# Patient Record
Sex: FEMALE | Race: WHITE | Employment: OTHER | URBAN - NONMETROPOLITAN AREA
[De-identification: names, ages, dates, MRNs, and addresses within clinical notes are randomized per-mention and may not be internally consistent; named-entity substitution may affect disease eponyms.]

---

## 2020-01-15 ENCOUNTER — NURSE ONLY (OUTPATIENT)
Dept: LAB | Age: 70
End: 2020-01-15

## 2020-01-15 ENCOUNTER — OFFICE VISIT (OUTPATIENT)
Dept: FAMILY MEDICINE CLINIC | Age: 70
End: 2020-01-15
Payer: MEDICARE

## 2020-01-15 VITALS
TEMPERATURE: 98.5 F | OXYGEN SATURATION: 98 % | DIASTOLIC BLOOD PRESSURE: 78 MMHG | HEART RATE: 98 BPM | HEIGHT: 64 IN | SYSTOLIC BLOOD PRESSURE: 122 MMHG | RESPIRATION RATE: 12 BRPM | BODY MASS INDEX: 40.43 KG/M2 | WEIGHT: 236.8 LBS

## 2020-01-15 PROBLEM — C51.9 MALIGNANT TUMOR OF VULVA (HCC): Status: ACTIVE | Noted: 2019-11-12

## 2020-01-15 PROBLEM — C21.0: Status: ACTIVE | Noted: 2019-09-09

## 2020-01-15 PROBLEM — R53.83 FATIGUE: Status: ACTIVE | Noted: 2020-01-15

## 2020-01-15 PROBLEM — M17.10 PRIMARY LOCALIZED OSTEOARTHROSIS, LOWER LEG: Status: ACTIVE | Noted: 2017-01-17

## 2020-01-15 PROBLEM — E66.01 OBESITY, MORBID, BMI 40.0-49.9 (HCC): Status: ACTIVE | Noted: 2019-11-14

## 2020-01-15 PROBLEM — I10 HYPERTENSION: Status: ACTIVE | Noted: 2017-12-22

## 2020-01-15 PROBLEM — H26.9 CATARACT: Status: ACTIVE | Noted: 2020-01-15

## 2020-01-15 PROBLEM — N60.19 FIBROCYSTIC BREAST: Status: ACTIVE | Noted: 2020-01-15

## 2020-01-15 PROBLEM — M19.90 ARTHRITIS: Status: ACTIVE | Noted: 2020-01-15

## 2020-01-15 PROBLEM — M54.9 BACKACHE: Status: ACTIVE | Noted: 2020-01-15

## 2020-01-15 PROBLEM — C51.9 PAGET'S DISEASE OF VULVA (HCC): Status: ACTIVE | Noted: 2019-11-12

## 2020-01-15 LAB
ALBUMIN SERPL-MCNC: 4.3 G/DL (ref 3.5–5.1)
ALP BLD-CCNC: 113 U/L (ref 38–126)
ALT SERPL-CCNC: 25 U/L (ref 11–66)
AMYLASE: 52 U/L (ref 20–104)
ANION GAP SERPL CALCULATED.3IONS-SCNC: 15 MEQ/L (ref 8–16)
AST SERPL-CCNC: 21 U/L (ref 5–40)
AVERAGE GLUCOSE: 111 MG/DL (ref 70–126)
BASOPHILS # BLD: 0.8 %
BASOPHILS ABSOLUTE: 0.1 THOU/MM3 (ref 0–0.1)
BILIRUB SERPL-MCNC: 0.4 MG/DL (ref 0.3–1.2)
BILIRUBIN DIRECT: < 0.2 MG/DL (ref 0–0.3)
BUN BLDV-MCNC: 22 MG/DL (ref 7–22)
CALCIUM SERPL-MCNC: 9.8 MG/DL (ref 8.5–10.5)
CEA: 6.1 NG/ML (ref 0–5)
CHLORIDE BLD-SCNC: 105 MEQ/L (ref 98–111)
CHOLESTEROL, TOTAL: 211 MG/DL (ref 100–199)
CO2: 23 MEQ/L (ref 23–33)
CREAT SERPL-MCNC: 0.6 MG/DL (ref 0.4–1.2)
EOSINOPHIL # BLD: 2.7 %
EOSINOPHILS ABSOLUTE: 0.2 THOU/MM3 (ref 0–0.4)
ERYTHROCYTE [DISTWIDTH] IN BLOOD BY AUTOMATED COUNT: 12.4 % (ref 11.5–14.5)
ERYTHROCYTE [DISTWIDTH] IN BLOOD BY AUTOMATED COUNT: 41.1 FL (ref 35–45)
ESTRADIOL LEVEL: < 5 PG/ML
FOLLICLE STIMULATING HORMONE: 35.7 MIU/ML (ref 16–160)
GFR SERPL CREATININE-BSD FRML MDRD: > 90 ML/MIN/1.73M2
GLUCOSE BLD-MCNC: 105 MG/DL (ref 70–108)
HBA1C MFR BLD: 5.7 % (ref 4.4–6.4)
HCT VFR BLD CALC: 44.5 % (ref 37–47)
HDLC SERPL-MCNC: 57 MG/DL
HEMOGLOBIN: 14.6 GM/DL (ref 12–16)
IMMATURE GRANS (ABS): 0.02 THOU/MM3 (ref 0–0.07)
IMMATURE GRANULOCYTES: 0.2 %
IRON: 107 UG/DL (ref 50–170)
LDL CHOLESTEROL CALCULATED: 130 MG/DL
LIPASE: 29.5 U/L (ref 5.6–51.3)
LUTEINIZING HORMONE: 20.5 MIU/ML (ref 3.3–70.6)
LYMPHOCYTES # BLD: 33.4 %
LYMPHOCYTES ABSOLUTE: 2.8 THOU/MM3 (ref 1–4.8)
MAGNESIUM: 1.9 MG/DL (ref 1.6–2.4)
MCH RBC QN AUTO: 30 PG (ref 26–33)
MCHC RBC AUTO-ENTMCNC: 32.8 GM/DL (ref 32.2–35.5)
MCV RBC AUTO: 91.4 FL (ref 81–99)
MONOCYTES # BLD: 6.1 %
MONOCYTES ABSOLUTE: 0.5 THOU/MM3 (ref 0.4–1.3)
NUCLEATED RED BLOOD CELLS: 0 /100 WBC
PLATELET # BLD: 271 THOU/MM3 (ref 130–400)
PMV BLD AUTO: 12.3 FL (ref 9.4–12.4)
POTASSIUM SERPL-SCNC: 3.7 MEQ/L (ref 3.5–5.2)
PROGESTERONE LEVEL: < 0.05 NG/ML
PTH INTACT: 19.8 PG/ML (ref 15–65)
RBC # BLD: 4.87 MILL/MM3 (ref 4.2–5.4)
RHEUMATOID FACTOR: 12 IU/ML (ref 0–13)
SEDIMENTATION RATE, ERYTHROCYTE: 18 MM/HR (ref 0–20)
SEG NEUTROPHILS: 56.8 %
SEGMENTED NEUTROPHILS ABSOLUTE COUNT: 4.8 THOU/MM3 (ref 1.8–7.7)
SODIUM BLD-SCNC: 143 MEQ/L (ref 135–145)
T3 TOTAL: 133 NG/DL (ref 72–181)
T4 FREE: 2.11 NG/DL (ref 0.93–1.76)
TOTAL IRON BINDING CAPACITY: 347 UG/DL (ref 171–450)
TOTAL PROTEIN: 7.7 G/DL (ref 6.1–8)
TRIGL SERPL-MCNC: 118 MG/DL (ref 0–199)
TSH SERPL DL<=0.05 MIU/L-ACNC: 0.05 UIU/ML (ref 0.4–4.2)
URIC ACID: 5.3 MG/DL (ref 2.4–5.7)
VITAMIN D 25-HYDROXY: 69 NG/ML (ref 30–100)
WBC # BLD: 8.4 THOU/MM3 (ref 4.8–10.8)

## 2020-01-15 PROCEDURE — 3017F COLORECTAL CA SCREEN DOC REV: CPT | Performed by: FAMILY MEDICINE

## 2020-01-15 PROCEDURE — 4040F PNEUMOC VAC/ADMIN/RCVD: CPT | Performed by: FAMILY MEDICINE

## 2020-01-15 PROCEDURE — 99204 OFFICE O/P NEW MOD 45 MIN: CPT | Performed by: FAMILY MEDICINE

## 2020-01-15 PROCEDURE — G8400 PT W/DXA NO RESULTS DOC: HCPCS | Performed by: FAMILY MEDICINE

## 2020-01-15 PROCEDURE — 1123F ACP DISCUSS/DSCN MKR DOCD: CPT | Performed by: FAMILY MEDICINE

## 2020-01-15 PROCEDURE — G8484 FLU IMMUNIZE NO ADMIN: HCPCS | Performed by: FAMILY MEDICINE

## 2020-01-15 PROCEDURE — 1090F PRES/ABSN URINE INCON ASSESS: CPT | Performed by: FAMILY MEDICINE

## 2020-01-15 PROCEDURE — G8427 DOCREV CUR MEDS BY ELIG CLIN: HCPCS | Performed by: FAMILY MEDICINE

## 2020-01-15 PROCEDURE — G8417 CALC BMI ABV UP PARAM F/U: HCPCS | Performed by: FAMILY MEDICINE

## 2020-01-15 PROCEDURE — 4004F PT TOBACCO SCREEN RCVD TLK: CPT | Performed by: FAMILY MEDICINE

## 2020-01-15 RX ORDER — MELATONIN
5000 DAILY PRN
COMMUNITY

## 2020-01-15 RX ORDER — LEVOTHYROXINE SODIUM 175 UG/1
1 TABLET ORAL DAILY
COMMUNITY

## 2020-01-15 RX ORDER — TRIAMTERENE AND HYDROCHLOROTHIAZIDE 37.5; 25 MG/1; MG/1
1 CAPSULE ORAL DAILY
COMMUNITY
Start: 2019-07-08

## 2020-01-15 RX ORDER — OMEPRAZOLE 20 MG/1
CAPSULE, DELAYED RELEASE ORAL PRN
COMMUNITY
Start: 2019-07-08

## 2020-01-15 SDOH — HEALTH STABILITY: MENTAL HEALTH: HOW OFTEN DO YOU HAVE A DRINK CONTAINING ALCOHOL?: NEVER

## 2020-01-15 ASSESSMENT — ENCOUNTER SYMPTOMS
SINUS PAIN: 1
SHORTNESS OF BREATH: 1
CONSTIPATION: 1
BACK PAIN: 1
ABDOMINAL PAIN: 1

## 2020-01-15 ASSESSMENT — PATIENT HEALTH QUESTIONNAIRE - PHQ9
SUM OF ALL RESPONSES TO PHQ9 QUESTIONS 1 & 2: 0
1. LITTLE INTEREST OR PLEASURE IN DOING THINGS: 0
2. FEELING DOWN, DEPRESSED OR HOPELESS: 0
SUM OF ALL RESPONSES TO PHQ QUESTIONS 1-9: 0
SUM OF ALL RESPONSES TO PHQ QUESTIONS 1-9: 0

## 2020-01-15 NOTE — PROGRESS NOTES
never smoked. She has never used smokeless tobacco. She reports previous alcohol use. She reports that she does not use drugs. Medications/Allergies/Immunizations:     Her current medication(s) include   Current Outpatient Medications:     MULTIPLE VITAMINS PO, Take 1 tablet by mouth daily as needed, Disp: , Rfl:     omeprazole (PRILOSEC) 20 MG delayed release capsule, as needed, Disp: , Rfl:     Cholecalciferol (VITAMIN D3) 25 MCG (1000 UT) TABS, Take 5,000 Units by mouth daily as needed, Disp: , Rfl:     levothyroxine (SYNTHROID) 175 MCG tablet, Take 1 tablet by mouth daily, Disp: , Rfl:     triamterene-hydrochlorothiazide (DYAZIDE) 37.5-25 MG per capsule, Take 1 capsule by mouth daily, Disp: , Rfl:   Allergies: Cat hair extract; Codeine; and Seasonal,  Immunizations:   Immunization History   Administered Date(s) Administered    Influenza A (C8D3-00) Vaccine PF IM 01/28/2010    Influenza Vaccine, unspecified formulation 11/19/2014    Influenza Virus Vaccine 11/19/2014    Influenza, High Dose (Fluzone 65 yrs and older) 12/22/2017, 12/12/2018    Pneumococcal Conjugate 13-valent ([id number]) 05/17/2017    Pneumococcal Polysaccharide ([id number]) 06/22/2018    Tdap (Boostrix, Adacel) 05/17/2017    Zoster Live (Zostavax) 04/14/2016        History of PresentIllness:     Micha's had concerns including Established New Doctor (WEARTEMIO PROTOCOL); Other (thyroid removed, gall bladder removed, ); Chronic Kidney Disease (stones); and Other (padgets disease of vulva). Allan Magana  presents to the [address] [place] [address] today for;   Chief Complaint   Patient presents with    Established New Doctor     [address] Other     thyroid removed, gall bladder removed,     Chronic Kidney Disease     stones    Other     padgets disease of vulva   , ,  abnormal labs follow up and these conditions as she  Is looking today for:     1. Arthritis    2.  Acute right-sided low back pain with sciatica, sciatica nursing note reviewed. Constitutional:       Appearance: Normal appearance. HENT:      Head: Normocephalic. Pulmonary:      Effort: Pulmonary effort is normal.   Neurological:      Mental Status: She is alert. Psychiatric:         Mood and Affect: Mood normal.         Thought Content: Thought content normal.            Laboratory Data:   Lab results were searched in Care Everywhere and/or those brought by the pateint were reviewed today with Alex Russell and she has a copy of their most recent labs to take home with them as notedbelow;       Imaging Data:   Imaging Data:       Assessment & Plan:       Impression:  1. Arthritis    2. Acute right-sided low back pain with sciatica, sciatica laterality unspecified    3. Breast calcifications    4. Calculus of kidney    5. Coronary artery disease involving native coronary artery of native heart without angina pectoris    6. Depressive disorder    7. Diverticulitis of colon    8. Extramammary Paget's disease of perianal region ([address]    9. Other fatigue    10. Fibrocystic breast disease (FCBD), unspecified laterality    11. Gastroesophageal reflux disease with esophagitis    12. Mixed hyperlipidemia    13. Essential hypertension    14. Hypothyroidism due to Hashimoto's thyroiditis    15. Irritable bowel syndrome with constipation    16. Malignant tumor of vulva ([address]    17. Obesity, morbid, BMI 40.0-49.9 ([address]    18. Primary localized osteoarthrosis of lower leg, unspecified laterality    19. Low glucose level    20. Other intestinal malabsorption      Assessment and Plan:  After reviewing the patients chief complaints, reviewing their labfindings in great detail (with the patient and those accompanying them) which correlate to their chief complaints, symptoms, and or medical conditions; suggestions were made relating to changes in diet and or supplementswhich may improve the complaints and which will be reflected in their future lab findings;   Chief Complaint   Patient log to future visits for inclusion in their record    - 75 better food list reviewed & given to patient along with the omega 6 food list to avoid      - Glutenin corn and oats abstracts sheet reviewed and given to the patient today    - 23 Foods containing Latex-like proteins was reviewed and copy to be taken if desired     - Nutrient Supplements list provided and copyto be taken if desired    - [employer] web site offered to patient to review at their convenience by staff with login information    Note:  I have discussed with the patient that with all nutraceuticals, there is often mixed data and emerging research which needs to be monitored; as well as an array of NIHfact sheets on nutrients and supplements. If I have recommended cinnamon at the request of this patient to assist them in control of their blood sugar, triglyceride and or weight issues. I discussed that thepatient's clinical use of cinnamon bark, calcium, magnesium, Vitamin D and pharmaceutical grade CVS #716184 fish oil or triple-strength fish oil, and B-75 two phase time-released B complex by Dilia William will be for atime-limited trial to determine their individual effectiveness and safety in this patient. I also referred the patient to the NMCD: Nutrition, Metabolism, and Cardiovascular Diseases (journal) and concerns about long-termuse and hepatotoxicity of cinnamon and other nutrients and suggest they frequently search nih.gov for the latest non-proprietary information on nutriceuticals as well as consider a subscription to [employer] fordetails on reviewed supplements, or at the least review the nutrient files at [address] at [facility], [employer], an insulin mimetic, reduces some High Carbohydrate Dietary Impacts.   Methylhydroxychalcone polymers insulin-enhancing properties in fat cells are responsible for enhanced glucose uptake, inhibiting hepatic HMG-CoA reductase and lowers lipids. www.jacn. org/content/20/4/327.full     But cinnamon with additivessuch as Cinnamon Extract are not effective as insulin mimetics. :eStoreDirectory.at     Nutrients for Start up from [employer] or [employer] for ease to get started now ;  Brianna Mora has some useable products;  - Triple Strength Fish Oil, enteric coated  - Vit D 3 5000 IU gel caps  - Iron ferrous sulfat 325 mg tabs  - Centrum Silver look-a-like for most patients, or  - Centrum plain look-a-like if need iron    Localpharmacies or chains such as [employer], [employer], have;  - Triple Strength Fish Oil (enteric coated ifavailable) or    If not enteric coated, can take from freezer for less burps  - B-50 or B-100 released balanced B complex tabs  - Cinnamon bark 500 mg (without Chromium or extracts)   some brands list 1000 mg / serving of 2 capsules,    some brands have 1000 mg caps with the undesireable chromium / extract  - Calcium carbonate/citrate, magnesium oxide/citrate, Vit D 3  as 3-4 tabs/caps/serving     Some Local Brands may contain Zincwhich is acceptable for the first bottle or two  - Magnesium oxide 250 mg tabs for those having < 2 bowel movements daily  - Magnesium citrate 200 mg if having > 2bowel movement/day  - Centrum Silver or look-a-like for most patients, Centrum plain or look-a-like with iron  - Vitamin D-3 comes as 1,000 IU or 2,000 IU or 5,000 IU gel caps or Liquid drops      Some brands containing or derived from soy oil or corn oil are OK if not allergic to soy  - Elemental Iron 65 mg tabsat bedtime is available over the counter if need more iron     Usually turns bowel movements grey, green or black but not a concern  - Apricot Kernel Oil (by Now) for dry skin sensitive perineal or perianal area skin    Nutrients for ongoing use by Mail order for less expense from www. [employer] ;  - Strength Fish Oil , 240 Softgels Item [id number]  -B-100 time released balanced B complex Item their food, drink, environment, activity, symptoms etc      Avoiding Latex-like proteins inmy foods; Avocados, Bananas, Celery, Figs & Kiwi proteins have latex-like proteins to inflame our immunesystems  How Can I Have A Latex Allergy? Eating foods with latex-like protein exposes us to latex allergies. Our body cannot tell the differencebetween these latex-like proteins and latex from rubber products since many people are allergic to fruit, vegetables and latex. Read labels on pre-packaged foods. This list to avoid is only a guide if you are known allergicto latex or have a latex rash on your chin, cheeks and lines on your neck and chest. The amount of latex is different in each food product or fruit variety. to Avoid out of Season if not grown locally: Melon, Nectarine, Papaya, Cherry, Passion fruit, Plum, Chestnuts, and Tomato. Avocado, Banana, Celery, Figs, and Kiwi always contain Latex-like protein. Whats in Season? Strawberries taste better in June than December because June is strawberry season so buy locally grown produce \"in season\" for the best flavor, cost and less Latex. Locally grown produce notonly tastes great requires little of no ethylene exposure in food distribution so has less latex content. Out of season, use canned, frozen or dried sinceprocessed ripe and are latex lower!!!   Month     [place] LocallyGrown Produce  January, February, March: use canned, frozen or dried fruits since lower in latex  April; asparagus, radishes  May; asparagus, broccoli, green onions, greens, peas, radishes,rhubarb  June; asparagus, beets, beans, broccoli, cabbage, cantaloupe, carrots, green onions, greens, lettuce,onions, parsley, peas, radishes, rhubarb, strawberries, watermelons  July; beans, beets, blueberries,broccoli, cabbage, cantaloupe, carrots, cauliflower, celery, cucumbers, eggplant, grapes, green onions, greens, lettuce, onions, parsley, peas, peaches, bell peppers, potatoes, radishes, summer raspberries, squash, sweetcorn, tomatoes, turnips, watermelons  August; apples, beans, beets, blueberries, cabbage, cantaloupe, carrots,cauliflower, celery, cucumbers, eggplant, grapes, green onions, greens, lettuce, onions, parsley, peas, peaches, pears, bell peppers, potatoes, radishes, squash, sweet corn, tomatoes, turnips, watermelons  September; apples, beans, beets, blueberries, cabbage, cantaloupe, carrots, cauliflower, celery, cucumbers, eggplant, grapes,green onions, greens, lettuce, onions, parsley, peas, peaches, pears, bell peppers, plums, potatoes, pumpkins, radishes, fall red raspberries, squash, sweet corn, tomatoes, turnips, watermelons  October; apples, beets, broccoli, cabbage, carrots, cauliflower, celery, green onions, greens, lettuce, parsley, peas, pears, potatoes,pumpkins, radishes, fall red raspberries, squash, turnips  November; broccoli, cabbage, carrots, parsley,pears, peas  December: use canned, frozen ordried fruits since lower in latex    Upto half of latex-sensitive patients show allergic reactions to fruits (avocados, bananas, kiwifruits, papayas, peaches),   Annals of Allergy, 1994. These plants contain the same proteins that are allergens in latex. People with fruit allergies should warn physicians beforeundergoing procedures which may cause anaphylactic reaction if in contact with latex gloves. Some of the common foods with defined cross-reactivity to latexare avocado, banana, kiwi, chestnut, raw potato, tomato,stone fruits (e.g., peach, cherry), hazelnut, melons, celery, carrot, apple, pear, papaya, and almond. Foods with less well-defined cross-reactivity to latex are peanuts, peppers, citrus fruits, coconut, pineapple, leroy,fig, passion fruit, Ugli fruit, and grape    This fruit/latex cross-reactivity is worsened by ethylene, a gas used to hasten commercial ripening.  In nature, plants produce low levels of the hormone ethylene, which regulates germination, flowering, and

## 2020-01-16 ENCOUNTER — TELEPHONE (OUTPATIENT)
Dept: FAMILY MEDICINE CLINIC | Age: 70
End: 2020-01-16

## 2020-01-16 LAB
C-REACTIVE PROTEIN, HIGH SENSITIVITY: 0.4 MG/L
HOMOCYSTEINE: 8.8 UMOL/L
IGE: 67 IU/ML
T3 FREE: 3.81 PG/ML (ref 2.02–4.43)
THYROXINE (T4): 13.4 UG/DL (ref 4.5–12)

## 2020-01-16 NOTE — TELEPHONE ENCOUNTER
Received a call from [facility] at [address] lab. Her Vitamin D is not passing for Medicare. Does she have vitamin d deficiency?     Please advise

## 2020-01-17 LAB
GLIADIN PEPTIDE IGG: < 0.4 U/ML
SCLERODERMA (SCL-70) AB: 44 U/ML
THYROID PEROXIDASE ANTIBODY: 10.4 IU/ML (ref 0–35)

## 2020-01-17 NOTE — TELEPHONE ENCOUNTER
Called to let them know the vitamin d deficiency is what he wants to use:  E55.9    Their were busy, went to , will call them back.

## 2020-01-18 LAB
ANA SCREEN: NORMAL
ANTI SSA: NORMAL
ANTI SSB: 24 AU/ML (ref 0–40)
ANTI-SMITH: 0 AU/ML (ref 0–40)
DHEAS (DHEA SULFATE): 65 UG/DL (ref 13–130)
THYROGLOBULIN: NORMAL

## 2020-01-20 LAB
DHEA UNCONJUGATED: 1.27 NG/ML (ref 0.63–4.7)
TESTOSTERONE, FREE W SHGB, FEMALES/CHILDREN: NORMAL

## 2020-01-31 ENCOUNTER — OFFICE VISIT (OUTPATIENT)
Dept: FAMILY MEDICINE CLINIC | Age: 70
End: 2020-01-31
Payer: MEDICARE

## 2020-01-31 VITALS
DIASTOLIC BLOOD PRESSURE: 77 MMHG | RESPIRATION RATE: 12 BRPM | SYSTOLIC BLOOD PRESSURE: 135 MMHG | BODY MASS INDEX: 40.12 KG/M2 | HEIGHT: 64 IN | HEART RATE: 65 BPM | OXYGEN SATURATION: 98 % | WEIGHT: 235 LBS

## 2020-01-31 PROCEDURE — G8427 DOCREV CUR MEDS BY ELIG CLIN: HCPCS | Performed by: NURSE PRACTITIONER

## 2020-01-31 PROCEDURE — G8400 PT W/DXA NO RESULTS DOC: HCPCS | Performed by: NURSE PRACTITIONER

## 2020-01-31 PROCEDURE — 4040F PNEUMOC VAC/ADMIN/RCVD: CPT | Performed by: NURSE PRACTITIONER

## 2020-01-31 PROCEDURE — 1090F PRES/ABSN URINE INCON ASSESS: CPT | Performed by: NURSE PRACTITIONER

## 2020-01-31 PROCEDURE — 4004F PT TOBACCO SCREEN RCVD TLK: CPT | Performed by: NURSE PRACTITIONER

## 2020-01-31 PROCEDURE — 1123F ACP DISCUSS/DSCN MKR DOCD: CPT | Performed by: NURSE PRACTITIONER

## 2020-01-31 PROCEDURE — 3017F COLORECTAL CA SCREEN DOC REV: CPT | Performed by: NURSE PRACTITIONER

## 2020-01-31 PROCEDURE — G8417 CALC BMI ABV UP PARAM F/U: HCPCS | Performed by: NURSE PRACTITIONER

## 2020-01-31 PROCEDURE — 99215 OFFICE O/P EST HI 40 MIN: CPT | Performed by: NURSE PRACTITIONER

## 2020-01-31 PROCEDURE — G8484 FLU IMMUNIZE NO ADMIN: HCPCS | Performed by: NURSE PRACTITIONER

## 2020-01-31 RX ORDER — IMIQUIMOD 12.5 MG/.25G
CREAM TOPICAL
COMMUNITY
Start: 2020-01-20 | End: 2020-04-11

## 2020-01-31 NOTE — PATIENT INSTRUCTIONS
Thank you   1. Thank you for trusting us with your healthcare needs. You may receive a survey regarding today's visit. It would help us out if you would take a few moments to provide your feedback. We value your input. 2. Please bring in ALL medications BOTTLES, including inhalers, herbal supplements, over the counter, prescribed & non-prescribed medicine. The office would like actual medication bottles and a list.   3. Please note our OFFICE POLICIES:   a. Prior to getting your labs drawn, please check with your insurance company for benefits and eligibility of lab services. Often, insurance companies cover certain tests for preventative visits only. It is patient's responsibility to see what is covered. b. We are unable to change a diagnosis after the test has been performed. c. Lab orders will not be re-printed. Please hold onto your original lab orders and take them to your lab to be completed. d. If you no show your scheduled appointment three times, you will be dismissed from this practice. e. Maryln Freshwater must be completed 24 hours prior to your schedule appointment. 4. If the list below has been completed, PLEASE FAX RECORDS TO OUR OFFICE @ [phone]. Once the records have been received we will update your records at our office:  Health Maintenance Due   Topic Date Due    Hepatitis C screen  1950    Breast cancer screen  10/13/2001    Colon cancer screen colonoscopy  10/13/2001    Shingles Vaccine (2 of 3) 06/09/2016    DEXA (modify frequency per FRAX score)  10/13/2016    Flu vaccine (1) 09/01/2019    Annual Wellness Visit (AWV)  12/22/2019     · Please take the supplements as directed by Dr. Candida Ramírez as reviewed today. · Please ensure you are informing your Primary Care Provider and any Specialist you see, and getting clearance, before starting any supplements.    · Will work on removing grains, latex like proteins in foods, and excess plant oil foods such as seeds, nuts, flax, soy, bananas, avocado, kiwis, humus, granola, poultry, farm-fed fish, and carrageenan (food additive)  · Work on reducing/eliminating gluten from the diet   · To reduce plant oil injury see www.efaeducation. org    · Let us know if we can assist you in any way  · Utilize the online classes  · Please keep all follow-up appointments as scheduled      Please start:      · Vitamin D 5000 units - 5 days a week  You can also try to increase your vitamin D level with your diet by eating:  Fatty fish - tuna, mackerel, and salmon. Foods fortified with vitamin D - some dairy products, orange juice, soy milk, and cereals  Beef liver  Cheese  Egg yolks  ·   · Magnesium - 1 tablet -  4 times daily   If you take Magnesium before meals it will stimulate the bowels  If you take Magnesium during or after meals it will not be as stimulating  · B-100  1 tablet -  daily with meals   time released  without vitamin C   Can get at [employer]  No Super-B complex  · Cinnamon 500 mg - 4 times daily before meals that contain carbs and at bedtime  Start with 1 capsule 4 times daily, if after the first week you tolerate it well you can increase to 2 capsules 4 times daily. Cinnamon is not a free pass to eat a lot of carbs and sugars   · Fish Oil -  1 capsule - four times daily with meals, CVS brand - Pharmaceutical grade 1,000 mg (item # [id number])  If you are eating fish for a meal you don't have to take a fish oil  Utilize Ptosd5tbqecv. com to assist you in decreasing the amount of Omega-6 (plant oil) in your diet  Cook from scratch, processed/cooked food in store/restaurants has Omega 6     DHEA - 10mg - every morning   [facility]

## 2020-01-31 NOTE — PROGRESS NOTES
[address]  [phone] (phone)  [phone] (fax)    Visit Date: 1/31/2020    Leslie Villavicencio is a 77 y.o. female who presents today for:  Chief Complaint   Patient presents with    Follow-up     WEE PROTOCOL LETTER            HPI:   Leslie Villavicencio presents today to discuss the letter that was sent regarding the WEE Protocol. This letter is based on the most recent labs results available to Dr. Cole Hatfield. Patient has multiple questions regarding lab results, nutrition, and supplements. We discussed in detail the results of the blood work and what supplements would be necessary to correct any imbalances. We discussed doses and frequency of the supplements. HPI  Health Maintenance   Topic Date Due    Hepatitis C screen  1950    Breast cancer screen  10/13/2001    Colon cancer screen colonoscopy  10/13/2001    Shingles Vaccine (2 of 3) 06/09/2016    DEXA (modify frequency per FRAX score)  10/13/2016    Flu vaccine (1) 09/01/2019    Annual Wellness Visit (AWV)  12/22/2019    A1C test (Diabetic or Prediabetic)  01/15/2021    TSH testing  01/15/2021    Potassium monitoring  01/15/2021    Creatinine monitoring  01/15/2021    Lipid screen  01/15/2025    DTaP/Tdap/Td vaccine (2 - Td) 05/17/2027    Pneumococcal 65+ years Vaccine  Completed       No past medical history on file. No past surgical history on file. No family history on file.   Social History     Tobacco Use    Smoking status: Never Smoker    Smokeless tobacco: Never Used   Substance Use Topics    Alcohol use: Not Currently     Frequency: Never      Current Outpatient Medications   Medication Sig Dispense Refill    imiquimod (ALDARA) 5 % cream Apply topically three times a week 36 doses      MULTIPLE VITAMINS PO Take 1 tablet by mouth daily as needed      omeprazole (PRILOSEC) 20 MG delayed release capsule as needed      Cholecalciferol (VITAMIN D3) 25 MCG (1000 UT) TABS Take 5,000 Units by mouth Panel; Future  - High sensitivity CRP; Future  - Hemoglobin A1C; Future  - DHEA-Sulfate; Future  - DHEA; Future  - Calcium; Future  - Sedimentation Rate; Future  - CEA; Future    12. Depressive disorder    - Magnesium; Future  - PTH, Intact; Future  - Vitamin D 25 Hydroxy; Future  - TSH without Reflex; Future  - T4; Future  - T3; Future  - Lipid Panel; Future  - High sensitivity CRP; Future  - Hemoglobin A1C; Future  - DHEA-Sulfate; Future  - DHEA; Future  - Calcium; Future  - Sedimentation Rate; Future  - CEA; Future    13. Other specified hyperalimentation     - Vitamin D 25 Hydroxy; Future    14. Other specified disorders of carbohydrate metabolism ([facility] [address]     - Hemoglobin A1C; Future    15. Elevated carcinoembryonic antigen (CEA)     - CEA; Future    · Please take the supplements as directed by Dr. Gab Interiano as reviewed today. · Please ensure you are informing your Primary Care Provider and any Specialist you see, and getting clearance, before starting any supplements. · Will work on removing grains, latex like proteins in foods, and excess plant oil foods such as seeds, nuts, flax, soy, bananas, avocado, kiwis, humus, granola, poultry, farm-fed fish, and carrageenan (food additive)  · Work on reducing/eliminating gluten from the diet   · To reduce plant oil injury see www.efaeducation. org    · Let us know if we can assist you in any way  · Utilize the online classes  · Please keep all follow-up appointments as scheduled      Please start:      · Vitamin D 5000 units - 5 days a week  You can also try to increase your vitamin D level with your diet by eating:  Fatty fish - tuna, mackerel, and salmon.   Foods fortified with vitamin D - some dairy products, orange juice, soy milk, and cereals  Beef liver  Cheese  Egg yolks  ·   · Magnesium - 1 tablet -  4 times daily   If you take Magnesium before meals it will stimulate the bowels  If you take Magnesium during or after meals it will not be as stimulating  · B-100 Rosaura Esparza - CNP on 2/3/2020 at 12:19 PM

## 2020-02-11 ENCOUNTER — TELEPHONE (OUTPATIENT)
Dept: FAMILY MEDICINE CLINIC | Age: 70
End: 2020-02-11

## 2020-02-11 NOTE — TELEPHONE ENCOUNTER
In response to her notes she faxed him. 1- yes, get the biopsey. 2- yes, keep doing the wee protocol as suggested. 3-yes, go to the [facility].    Patient voiced understanding and thanks Dr. Axel Toth for all that he does.

## 2020-03-12 ENCOUNTER — TELEPHONE (OUTPATIENT)
Dept: FAMILY MEDICINE CLINIC | Age: 70
End: 2020-03-12